# Patient Record
Sex: MALE | Race: WHITE | Employment: UNEMPLOYED | URBAN - METROPOLITAN AREA
[De-identification: names, ages, dates, MRNs, and addresses within clinical notes are randomized per-mention and may not be internally consistent; named-entity substitution may affect disease eponyms.]

---
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
## 2021-10-25 ENCOUNTER — OFFICE VISIT (OUTPATIENT)
Dept: FAMILY MEDICINE CLINIC | Age: 21
End: 2021-10-25
Payer: COMMERCIAL

## 2021-10-25 VITALS
OXYGEN SATURATION: 99 % | SYSTOLIC BLOOD PRESSURE: 130 MMHG | TEMPERATURE: 97.5 F | WEIGHT: 263 LBS | DIASTOLIC BLOOD PRESSURE: 76 MMHG | HEART RATE: 72 BPM

## 2021-10-25 DIAGNOSIS — J02.9 VIRAL PHARYNGITIS: Primary | ICD-10-CM

## 2021-10-25 LAB — S PYO AG THROAT QL: NORMAL

## 2021-10-25 PROCEDURE — 87880 STREP A ASSAY W/OPTIC: CPT | Performed by: PHYSICIAN ASSISTANT

## 2021-10-25 PROCEDURE — 99213 OFFICE O/P EST LOW 20 MIN: CPT | Performed by: PHYSICIAN ASSISTANT

## 2021-10-25 NOTE — PROGRESS NOTES
age..  Throat: Airway patent. Posterior pharynx with mild erythema and tonsillar hypertrophy. No exudate noted. Neck:  Supple with full ROM. There is no anterior bilateral adenopathy. Lungs:  Clear to auscultation and breath sounds equal.    CV: Regular rate and rhythm, normal heart sounds, without pathological murmurs, ectopy, gallops, or rubs. Skin:  No rashes, erythema present. Lymphatics: No lymphangitis or adenopathy noted other then stated above. Neurological:  Alert and orientated. Motor functions intact. Responds to commands. Lab / Imaging Results   (All laboratory and radiology results have been personally reviewed by myself)  Labs:  Results for orders placed or performed in visit on 10/25/21   POCT rapid strep A   Result Value Ref Range    Strep A Ag None Detected None Detected       Imaging: All Radiology results interpreted by Radiologist unless otherwise noted. Assessment / Plan     Impression(s):  PRADIP was seen today for drainage and pharyngitis. Diagnoses and all orders for this visit:    Viral pharyngitis  -     POCT rapid strep A      Disposition:  Disposition: Discharge to home. Rapid strep came back negative. Pt advised that his illness is likely viral and should resolve with time and conservative measures. Increase fluids and rest.  Tylenol and/or NSAIDs prn pain/fever. F/u PCP in 5-7 days if symptoms persist. ED sooner if symptoms worsen or change. ED immediately with the development of refractory fever, shaking chills, dyspnea, dysphagia, neck stiffness, vomiting, etc. Pt is in agreement with this care plan. All questions answered. Fly Barrett PA-C    **This report was transcribed using voice recognition software. Every effort was made to ensure accuracy; however, inadvertent computerized transcription errors may be present.